# Patient Record
Sex: MALE | Race: WHITE | NOT HISPANIC OR LATINO | Employment: FULL TIME | URBAN - METROPOLITAN AREA
[De-identification: names, ages, dates, MRNs, and addresses within clinical notes are randomized per-mention and may not be internally consistent; named-entity substitution may affect disease eponyms.]

---

## 2017-11-29 ENCOUNTER — OFFICE VISIT (OUTPATIENT)
Dept: FAMILY MEDICINE CLINIC | Facility: CLINIC | Age: 27
End: 2017-11-29

## 2017-11-29 VITALS
RESPIRATION RATE: 16 BRPM | SYSTOLIC BLOOD PRESSURE: 110 MMHG | HEART RATE: 82 BPM | OXYGEN SATURATION: 98 % | DIASTOLIC BLOOD PRESSURE: 76 MMHG | HEIGHT: 70 IN | BODY MASS INDEX: 29.2 KG/M2 | WEIGHT: 204 LBS | TEMPERATURE: 97.8 F

## 2017-11-29 DIAGNOSIS — F17.210 CIGARETTE NICOTINE DEPENDENCE WITHOUT COMPLICATION: ICD-10-CM

## 2017-11-29 DIAGNOSIS — K58.0 IRRITABLE BOWEL SYNDROME WITH DIARRHEA: ICD-10-CM

## 2017-11-29 DIAGNOSIS — Z76.89 ENCOUNTER TO ESTABLISH CARE: Primary | ICD-10-CM

## 2017-11-29 DIAGNOSIS — F17.220 CHEWING TOBACCO NICOTINE DEPENDENCE WITHOUT COMPLICATION: ICD-10-CM

## 2017-11-29 PROCEDURE — 99407 BEHAV CHNG SMOKING > 10 MIN: CPT | Performed by: PHYSICIAN ASSISTANT

## 2017-11-29 PROCEDURE — 99203 OFFICE O/P NEW LOW 30 MIN: CPT | Performed by: PHYSICIAN ASSISTANT

## 2017-11-29 RX ORDER — BUPROPION HYDROCHLORIDE 150 MG/1
TABLET, EXTENDED RELEASE ORAL
Qty: 60 TABLET | Refills: 2 | Status: SHIPPED | OUTPATIENT
Start: 2017-11-29 | End: 2018-08-29

## 2017-11-29 RX ORDER — DICYCLOMINE HYDROCHLORIDE 10 MG/1
10 CAPSULE ORAL
Qty: 120 CAPSULE | Refills: 1 | Status: SHIPPED | OUTPATIENT
Start: 2017-11-29 | End: 2019-05-06

## 2017-11-29 RX ORDER — BROMPHENIRAMINE MALEATE, PSEUDOEPHEDRINE HYDROCHLORIDE, AND DEXTROMETHORPHAN HYDROBROMIDE 2; 30; 10 MG/5ML; MG/5ML; MG/5ML
SYRUP ORAL 4 TIMES DAILY PRN
COMMUNITY
End: 2017-11-30

## 2017-11-29 NOTE — PROGRESS NOTES
Yanique Pride is a 27 y.o. male.     History of Present Illness   Pt presents to establish care. No significant PMH. Been awhile since had primary provider.     Frequent diarrhea, right after eating. Has been going on for years . Goes usually 3 X per day   Belches a lot   Feels like something gets stuck in throat sometimes   Denies heartburn. Some stomach cramping   No family hx of colon cancer or polyps  No particular food triggers he is aware of.   No issues with constipation     Dips, 15 years (started at age 12)  Use full can (started at age 12-13_   Smokes 2-3 cigarettes per day   Has quit in the past, went to E-cigarettes X 5-6 months. Went down to low nicotine level. Father passed away and started back up   Patient has tried nicotine gum in the past. Ran out a lot. 20 in a box, only last 2-3 days   Interested in trying Wellbutrin to help and quit for good.     The following portions of the patient's history were reviewed and updated as appropriate: allergies, current medications, past family history, past medical history, past social history, past surgical history and problem list.    Review of Systems   Constitutional: Negative.  Negative for chills, diaphoresis, fatigue and fever.   HENT: Negative.  Negative for congestion, ear discharge, ear pain, hearing loss, nosebleeds, postnasal drip, sinus pressure, sneezing and sore throat.    Eyes: Negative.    Respiratory: Negative.  Negative for cough, chest tightness, shortness of breath and wheezing.    Cardiovascular: Negative.  Negative for chest pain, palpitations and leg swelling.   Gastrointestinal: Positive for diarrhea. Negative for abdominal distention, abdominal pain, anal bleeding, blood in stool, constipation, nausea, rectal pain and vomiting.        Stomach cramping    Endocrine: Negative.  Negative for cold intolerance, heat intolerance, polydipsia, polyphagia and polyuria.   Genitourinary: Negative.  Negative for difficulty urinating,  "dysuria, flank pain, frequency, hematuria and urgency.   Musculoskeletal: Negative.  Negative for arthralgias, back pain, gait problem, joint swelling, myalgias, neck pain and neck stiffness.   Skin: Negative.  Negative for color change, pallor, rash and wound.   Allergic/Immunologic: Negative.  Negative for immunocompromised state.   Neurological: Negative for dizziness, syncope, weakness, light-headedness, numbness and headaches.   Hematological: Negative.  Negative for adenopathy. Does not bruise/bleed easily.   Psychiatric/Behavioral: Negative.  Negative for behavioral problems, confusion, self-injury, sleep disturbance and suicidal ideas. The patient is not nervous/anxious.        Objective    Blood pressure 110/76, pulse 82, temperature 97.8 °F (36.6 °C), temperature source Temporal Artery , resp. rate 16, height 70\" (177.8 cm), weight 204 lb (92.5 kg), SpO2 98 %.     Physical Exam   Constitutional: He is oriented to person, place, and time. He appears well-developed and well-nourished.   HENT:   Head: Normocephalic and atraumatic.   Right Ear: Tympanic membrane, external ear and ear canal normal.   Left Ear: Tympanic membrane, external ear and ear canal normal.   Nose: Nose normal.   Mouth/Throat: Oropharynx is clear and moist. No oropharyngeal exudate.   Eyes: Conjunctivae and EOM are normal. Pupils are equal, round, and reactive to light.   Neck: Normal range of motion. Neck supple. No tracheal deviation present. No thyromegaly present.   Cardiovascular: Normal rate, regular rhythm, normal heart sounds and intact distal pulses.  Exam reveals no gallop and no friction rub.    No murmur heard.  Pulmonary/Chest: Effort normal and breath sounds normal. No respiratory distress. He has no wheezes. He has no rales. He exhibits no tenderness.   Abdominal: Soft. Bowel sounds are normal. He exhibits no distension and no mass. There is no tenderness. There is no rebound and no guarding. No hernia.   Musculoskeletal: " Normal range of motion. He exhibits no edema, tenderness or deformity.   Lymphadenopathy:     He has no cervical adenopathy.   Neurological: He is alert and oriented to person, place, and time. He has normal reflexes.   Skin: Skin is warm and dry.   Psychiatric: He has a normal mood and affect. His behavior is normal. Judgment and thought content normal.   Nursing note and vitals reviewed.      Assessment/Plan   Neil was seen today for establish care and discuss dipping, smoking.    Diagnoses and all orders for this visit:    Encounter to establish care    Cigarette nicotine dependence without complication  -     buPROPion SR (WELLBUTRIN SR) 150 MG 12 hr tablet; Take one tablet by mouth daily X 7 days, then take one tablet by mouth BID    Chewing tobacco nicotine dependence without complication    Irritable bowel syndrome with diarrhea  -     dicyclomine (BENTYL) 10 MG capsule; Take 1 capsule by mouth 4 (Four) Times a Day Before Meals & at Bedtime.    I advised the patient of the risks in continuing to use tobacco, and I provided this patient with smoking cessation educational materials.  I also discussed how to quit smoking and the patient has expressed the willingness to quit.      During this visit, I spent greater than 10 minutes counseling the patient regarding smoking cessation. Will try wellbutrin. F/U in 4-6 weeks     Will try bentyl as directed for likely IBS-D. F/U if no improvement. Encourage screening labs in the future. Encourage trial of cutting out dairy (eats a lot of cheese)

## 2017-11-30 PROBLEM — F17.220 CHEWING TOBACCO NICOTINE DEPENDENCE WITHOUT COMPLICATION: Status: ACTIVE | Noted: 2017-11-30

## 2017-11-30 PROBLEM — F17.210 CIGARETTE NICOTINE DEPENDENCE WITHOUT COMPLICATION: Status: ACTIVE | Noted: 2017-11-30

## 2017-11-30 PROBLEM — K58.0 IRRITABLE BOWEL SYNDROME WITH DIARRHEA: Status: ACTIVE | Noted: 2017-11-30

## 2018-01-31 ENCOUNTER — HOSPITAL ENCOUNTER (OUTPATIENT)
Dept: GENERAL RADIOLOGY | Facility: HOSPITAL | Age: 28
Discharge: HOME | End: 2018-01-31
Admitting: PHYSICIAN ASSISTANT

## 2018-01-31 ENCOUNTER — OFFICE VISIT (OUTPATIENT)
Dept: FAMILY MEDICINE CLINIC | Facility: CLINIC | Age: 28
End: 2018-01-31

## 2018-01-31 VITALS
SYSTOLIC BLOOD PRESSURE: 114 MMHG | HEART RATE: 73 BPM | HEIGHT: 70 IN | RESPIRATION RATE: 18 BRPM | TEMPERATURE: 97.3 F | BODY MASS INDEX: 29.2 KG/M2 | OXYGEN SATURATION: 99 % | WEIGHT: 204 LBS | DIASTOLIC BLOOD PRESSURE: 74 MMHG

## 2018-01-31 DIAGNOSIS — F17.210 CIGARETTE NICOTINE DEPENDENCE WITHOUT COMPLICATION: ICD-10-CM

## 2018-01-31 DIAGNOSIS — S39.92XD INJURY OF LOW BACK, SUBSEQUENT ENCOUNTER: Primary | ICD-10-CM

## 2018-01-31 DIAGNOSIS — F17.220 CHEWING TOBACCO NICOTINE DEPENDENCE WITHOUT COMPLICATION: ICD-10-CM

## 2018-01-31 DIAGNOSIS — M54.50 ACUTE MIDLINE LOW BACK PAIN WITHOUT SCIATICA: ICD-10-CM

## 2018-01-31 PROCEDURE — 99406 BEHAV CHNG SMOKING 3-10 MIN: CPT | Performed by: PHYSICIAN ASSISTANT

## 2018-01-31 PROCEDURE — 72100 X-RAY EXAM L-S SPINE 2/3 VWS: CPT

## 2018-01-31 PROCEDURE — 99213 OFFICE O/P EST LOW 20 MIN: CPT | Performed by: PHYSICIAN ASSISTANT

## 2018-01-31 NOTE — PROGRESS NOTES
Subjective   Neil Pride is a 27 y.o. male.     F/U form ER visit yesterday at [facility]. Was working in garage around 2-3 pm yesterday, was lifting about a 3 lb box of parts. Turned and felt sudden pain in his low back. Brought him to the ground and could hardly get up. Went to the hospital following this incident. No imaging, just exam and acute pain management. States he is a little better today, but still very uncomfortable. No radiation of pain. No leg symptoms.   ER sent him home with Rx for cyclobenzaprine, Norco, and Ibuprofen 800 mg. Has not started taking. Rx's will be scanned into chart.   Works at [employer]. Not ready to return at this time. Unable to work with restrictions, and job can be physically demanding.     Back Pain   This is a new problem. The current episode started yesterday. The problem occurs constantly. The problem has been gradually improving since onset. The pain is present in the lumbar spine. The quality of the pain is described as aching. The pain does not radiate. The pain is at a severity of 6/10. The pain is moderate. The pain is the same all the time. The symptoms are aggravated by bending, standing, twisting and position. Stiffness is present all day. Pertinent negatives include no abdominal pain, bladder incontinence, bowel incontinence, chest pain, dysuria, fever, headaches, leg pain, numbness, paresis, paresthesias, pelvic pain, perianal numbness, tingling, weakness or weight loss. He has tried nothing for the symptoms.        The following portions of the patient's history were reviewed and updated as appropriate: allergies, current medications, past family history, past medical history, past social history, past surgical history and problem list.    Review of Systems   Constitutional: Negative.  Negative for chills, diaphoresis, fatigue, fever and weight loss.   HENT: Negative.  Negative for congestion, ear discharge, ear pain, hearing loss, nosebleeds,  "postnasal drip, sinus pressure, sneezing and sore throat.    Eyes: Negative.    Respiratory: Negative.  Negative for cough, chest tightness, shortness of breath and wheezing.    Cardiovascular: Negative.  Negative for chest pain, palpitations and leg swelling.   Gastrointestinal: Negative for abdominal distention, abdominal pain, anal bleeding, blood in stool, bowel incontinence, constipation, diarrhea, nausea, rectal pain and vomiting.   Endocrine: Negative.  Negative for cold intolerance, heat intolerance, polydipsia, polyphagia and polyuria.   Genitourinary: Negative.  Negative for bladder incontinence, difficulty urinating, dysuria, flank pain, frequency, hematuria, pelvic pain and urgency.   Musculoskeletal: Positive for back pain and myalgias. Negative for arthralgias, gait problem, joint swelling, neck pain and neck stiffness.   Skin: Negative.  Negative for color change, pallor, rash and wound.   Allergic/Immunologic: Negative.  Negative for immunocompromised state.   Neurological: Negative for dizziness, tingling, syncope, weakness, light-headedness, numbness, headaches and paresthesias.   Hematological: Negative.  Negative for adenopathy. Does not bruise/bleed easily.   Psychiatric/Behavioral: Negative.  Negative for behavioral problems, confusion, self-injury, sleep disturbance and suicidal ideas. The patient is not nervous/anxious.        Objective    Blood pressure 114/74, pulse 73, temperature 97.3 °F (36.3 °C), temperature source Temporal Artery , resp. rate 18, height 177.8 cm (70\"), weight 92.5 kg (204 lb), SpO2 99 %.     Physical Exam   Constitutional: He is oriented to person, place, and time. He appears well-developed and well-nourished.   HENT:   Head: Normocephalic and atraumatic.   Right Ear: External ear normal.   Left Ear: External ear normal.   Nose: Nose normal.   Mouth/Throat: Oropharynx is clear and moist. No oropharyngeal exudate.   Eyes: Conjunctivae and EOM are normal. Pupils are " equal, round, and reactive to light.   Neck: Normal range of motion. Neck supple. No tracheal deviation present. No thyromegaly present.   Cardiovascular: Normal rate, regular rhythm, normal heart sounds and intact distal pulses.  Exam reveals no gallop and no friction rub.    No murmur heard.  Pulmonary/Chest: Effort normal and breath sounds normal. No respiratory distress. He has no wheezes. He has no rales. He exhibits no tenderness.   Abdominal: Soft. Bowel sounds are normal. He exhibits no distension and no mass. There is no tenderness. There is no rebound and no guarding. No hernia.   Musculoskeletal: He exhibits no edema or deformity.        Lumbar back: He exhibits decreased range of motion, tenderness, bony tenderness and pain. He exhibits no swelling, no edema, no deformity and no spasm.   Lymphadenopathy:     He has no cervical adenopathy.   Neurological: He is alert and oriented to person, place, and time. He has normal reflexes.   Skin: Skin is warm and dry.   Psychiatric: He has a normal mood and affect. His behavior is normal. Judgment and thought content normal.   Nursing note and vitals reviewed.      Assessment/Plan   Neil was seen today for back pain.    Diagnoses and all orders for this visit:    Injury of low back, subsequent encounter  -     XR Spine Lumbar AP & Lateral    Acute midline low back pain without sciatica  -     XR Spine Lumbar AP & Lateral      Will proceed with XR imaging since pain is very midline. F/U pending imaging. Will complete FMLA papers for work as he should remain off at this time. F/U in office in 1 week to re-evaluate symptoms. May take medication as prescribed by ER, reiterated abuse and addiction potential of narcotic pain medication.     It is very important that he quit smoking. There are various alternatives available to help with this difficult task, but first and foremost, he must make a firm commitment and decision to quit. The nature of nicotine addiction is  discussed. The usefulness of behavioral therapy is discussed and suggested.  The correct use, cost and side effects of nicotine replacement therapy such as gum or patches is discussed. Bupropion and its cost (sometimes not covered fully by insurance) and side effects are reviewed. The quit rates are discussed. I recommend he not allow potential costs of treatment to deter him from using nicotine replacement therapy or bupropion, as the long term economic and health benefits are obvious.   I advised the patient of the risks in continuing to use tobacco, and I provided this patient with smoking cessation educational materials.  I also discussed how to quit smoking and the patient has expressed the willingness to quit.      During this visit, I spent 3-10 mintues counseling the patient regarding smoking cessation.

## 2018-02-07 ENCOUNTER — OFFICE VISIT (OUTPATIENT)
Dept: FAMILY MEDICINE CLINIC | Facility: CLINIC | Age: 28
End: 2018-02-07

## 2018-02-07 VITALS
HEART RATE: 78 BPM | DIASTOLIC BLOOD PRESSURE: 70 MMHG | SYSTOLIC BLOOD PRESSURE: 120 MMHG | BODY MASS INDEX: 29.35 KG/M2 | TEMPERATURE: 97.9 F | HEIGHT: 70 IN | OXYGEN SATURATION: 98 % | RESPIRATION RATE: 16 BRPM | WEIGHT: 205 LBS

## 2018-02-07 DIAGNOSIS — M54.50 ACUTE MIDLINE LOW BACK PAIN WITHOUT SCIATICA: ICD-10-CM

## 2018-02-07 DIAGNOSIS — S39.92XD INJURY OF LOW BACK, SUBSEQUENT ENCOUNTER: Primary | ICD-10-CM

## 2018-02-07 PROCEDURE — 99213 OFFICE O/P EST LOW 20 MIN: CPT | Performed by: PHYSICIAN ASSISTANT

## 2018-02-07 RX ORDER — CYCLOBENZAPRINE HCL 10 MG
TABLET ORAL
COMMUNITY
Start: 2018-01-31 | End: 2018-02-07

## 2018-02-07 RX ORDER — IBUPROFEN 800 MG/1
TABLET ORAL
COMMUNITY
Start: 2018-01-31 | End: 2018-02-07

## 2018-02-07 NOTE — PROGRESS NOTES
Yanique Pride is a 27 y.o. male.     History of Present Illness   Pt presents for f/u for back injury and midline low back pain. Has been resting, taking NSAID, muscle relaxer and analgesic as needed. Stopped taking as of today. Feels much better today. Only feels muscle tightness from time to time. Ready to return to work tomorrow.     The following portions of the patient's history were reviewed and updated as appropriate: allergies, current medications, past family history, past medical history, past social history, past surgical history and problem list.    Review of Systems   Constitutional: Negative.  Negative for chills, diaphoresis, fatigue and fever.   HENT: Negative.  Negative for congestion, ear discharge, ear pain, hearing loss, nosebleeds, postnasal drip, sinus pressure, sneezing and sore throat.    Eyes: Negative.    Respiratory: Negative.  Negative for cough, chest tightness, shortness of breath and wheezing.    Cardiovascular: Negative.  Negative for chest pain, palpitations and leg swelling.   Gastrointestinal: Negative for abdominal distention, abdominal pain, anal bleeding, blood in stool, constipation, diarrhea, nausea, rectal pain and vomiting.   Endocrine: Negative.  Negative for cold intolerance, heat intolerance, polydipsia, polyphagia and polyuria.   Genitourinary: Negative.  Negative for difficulty urinating, dysuria, flank pain, frequency, hematuria and urgency.   Musculoskeletal: Positive for back pain and myalgias. Negative for arthralgias, gait problem, joint swelling, neck pain and neck stiffness.   Skin: Negative.  Negative for color change, pallor, rash and wound.   Allergic/Immunologic: Negative.  Negative for immunocompromised state.   Neurological: Negative for dizziness, syncope, weakness, light-headedness, numbness and headaches.   Hematological: Negative.  Negative for adenopathy. Does not bruise/bleed easily.   Psychiatric/Behavioral: Negative.  Negative for  "behavioral problems, confusion, self-injury, sleep disturbance and suicidal ideas. The patient is not nervous/anxious.        Objective    Blood pressure 120/70, pulse 78, temperature 97.9 °F (36.6 °C), temperature source Temporal Artery , resp. rate 16, height 177.8 cm (70\"), weight 93 kg (205 lb), SpO2 98 %.     Physical Exam   Constitutional: He is oriented to person, place, and time. He appears well-developed and well-nourished.   HENT:   Head: Normocephalic and atraumatic.   Right Ear: External ear normal.   Left Ear: External ear normal.   Nose: Nose normal.   Mouth/Throat: Oropharynx is clear and moist. No oropharyngeal exudate.   Eyes: Conjunctivae and EOM are normal. Pupils are equal, round, and reactive to light.   Neck: Normal range of motion. Neck supple. No tracheal deviation present. No thyromegaly present.   Cardiovascular: Normal rate, regular rhythm, normal heart sounds and intact distal pulses.  Exam reveals no gallop and no friction rub.    No murmur heard.  Pulmonary/Chest: Effort normal and breath sounds normal. No respiratory distress. He has no wheezes. He has no rales. He exhibits no tenderness.   Abdominal: Soft. Bowel sounds are normal. He exhibits no distension and no mass. There is no tenderness. There is no rebound and no guarding. No hernia.   Musculoskeletal: Normal range of motion. He exhibits no edema or deformity.        Lumbar back: He exhibits tenderness. He exhibits normal range of motion, no bony tenderness, no swelling, no edema, no deformity, no laceration, no pain, no spasm and normal pulse.   Lymphadenopathy:     He has no cervical adenopathy.   Neurological: He is alert and oriented to person, place, and time. He has normal reflexes.   Skin: Skin is warm and dry.   Psychiatric: He has a normal mood and affect. His behavior is normal. Judgment and thought content normal.   Nursing note and vitals reviewed.      Assessment/Plan   Neil was seen today for back " pain.    Diagnoses and all orders for this visit:    Injury of low back, subsequent encounter    Acute midline low back pain without sciatica      Release back to work tomorrow 2/8/18 without restrictions. Pt to follow up if any new or worsening symptoms develop. Pt agrees.

## 2018-08-29 ENCOUNTER — OFFICE VISIT (OUTPATIENT)
Dept: FAMILY MEDICINE CLINIC | Facility: CLINIC | Age: 28
End: 2018-08-29

## 2018-08-29 VITALS
SYSTOLIC BLOOD PRESSURE: 104 MMHG | DIASTOLIC BLOOD PRESSURE: 70 MMHG | RESPIRATION RATE: 20 BRPM | WEIGHT: 205 LBS | BODY MASS INDEX: 29.35 KG/M2 | TEMPERATURE: 97 F | HEIGHT: 70 IN | HEART RATE: 72 BPM

## 2018-08-29 DIAGNOSIS — Z23 NEED FOR HEPATITIS A VACCINATION: Primary | ICD-10-CM

## 2018-08-29 PROCEDURE — 90471 IMMUNIZATION ADMIN: CPT | Performed by: PHYSICIAN ASSISTANT

## 2018-08-29 PROCEDURE — 90632 HEPA VACCINE ADULT IM: CPT | Performed by: PHYSICIAN ASSISTANT

## 2018-08-29 PROCEDURE — 99212 OFFICE O/P EST SF 10 MIN: CPT | Performed by: PHYSICIAN ASSISTANT

## 2018-08-29 NOTE — PROGRESS NOTES
"Yanique Pride is a 28 y.o. male.     History of Present Illness   Pt presents requesting Hep A vaccine. Concerned over recent outbreaks close to his home including local [employer]. Pt denies current illness or previous severe immunization reaction.     The following portions of the patient's history were reviewed and updated as appropriate: allergies, current medications, past family history, past medical history, past social history, past surgical history and problem list.    Review of Systems   Constitutional: Negative for chills, fatigue and fever.   HENT: Negative for congestion, ear pain, rhinorrhea, sneezing and sore throat.    Eyes: Negative.    Respiratory: Negative for cough, shortness of breath and wheezing.    Cardiovascular: Negative for chest pain.   Gastrointestinal: Negative for abdominal pain, constipation, diarrhea, nausea and vomiting.   Musculoskeletal: Negative for arthralgias and myalgias.   Skin: Negative for rash.   Allergic/Immunologic: Negative.    Neurological: Negative for headaches.       Objective    Blood pressure 104/70, pulse 72, temperature 97 °F (36.1 °C), resp. rate 20, height 177.8 cm (70\"), weight 93 kg (205 lb).     Physical Exam   Constitutional: He is oriented to person, place, and time. He appears well-developed and well-nourished.   HENT:   Head: Normocephalic.   Right Ear: Tympanic membrane, external ear and ear canal normal.   Left Ear: Tympanic membrane, external ear and ear canal normal.   Nose: Nose normal.   Mouth/Throat: Oropharynx is clear and moist.   Eyes: Conjunctivae are normal.   Cardiovascular: Normal rate, regular rhythm and normal heart sounds.    Pulmonary/Chest: Effort normal and breath sounds normal. No respiratory distress.   Lymphadenopathy:     He has no cervical adenopathy.   Neurological: He is alert and oriented to person, place, and time.   Skin: Skin is warm.   Psychiatric: He has a normal mood and affect. His behavior is normal. " Judgment and thought content normal.   Nursing note and vitals reviewed.      Assessment/Plan   Neil was seen today for immunizations.    Diagnoses and all orders for this visit:    Need for hepatitis A vaccination  -     Hepatitis A Vaccine Adult IM      Pt educated on Hep A vaccine including risks and benefits. Pt consents to immunization. Return in 6 months for second dose.

## 2019-03-08 ENCOUNTER — OFFICE VISIT (OUTPATIENT)
Dept: FAMILY MEDICINE CLINIC | Facility: CLINIC | Age: 29
End: 2019-03-08

## 2019-03-08 VITALS
DIASTOLIC BLOOD PRESSURE: 68 MMHG | OXYGEN SATURATION: 98 % | HEART RATE: 95 BPM | SYSTOLIC BLOOD PRESSURE: 108 MMHG | BODY MASS INDEX: 29.7 KG/M2 | WEIGHT: 207 LBS | TEMPERATURE: 97.8 F | RESPIRATION RATE: 18 BRPM

## 2019-03-08 DIAGNOSIS — J02.9 SORE THROAT: ICD-10-CM

## 2019-03-08 DIAGNOSIS — J20.9 ACUTE BRONCHITIS, UNSPECIFIED ORGANISM: Primary | ICD-10-CM

## 2019-03-08 LAB
EXPIRATION DATE: NORMAL
INTERNAL CONTROL: NORMAL
Lab: NORMAL
S PYO AG THROAT QL: NEGATIVE

## 2019-03-08 PROCEDURE — 99213 OFFICE O/P EST LOW 20 MIN: CPT | Performed by: FAMILY MEDICINE

## 2019-03-08 PROCEDURE — 87880 STREP A ASSAY W/OPTIC: CPT | Performed by: FAMILY MEDICINE

## 2019-03-08 RX ORDER — DEXTROMETHORPHAN HYDROBROMIDE AND PROMETHAZINE HYDROCHLORIDE 15; 6.25 MG/5ML; MG/5ML
5 SYRUP ORAL 4 TIMES DAILY PRN
Qty: 180 ML | Refills: 0 | Status: SHIPPED | OUTPATIENT
Start: 2019-03-08 | End: 2019-05-06

## 2019-03-08 RX ORDER — METHYLPREDNISOLONE 4 MG/1
TABLET ORAL
Qty: 21 EACH | Refills: 0 | Status: SHIPPED | OUTPATIENT
Start: 2019-03-08 | End: 2019-05-06

## 2019-03-08 RX ORDER — AZITHROMYCIN 250 MG/1
TABLET, FILM COATED ORAL
Qty: 6 TABLET | Refills: 0 | Status: SHIPPED | OUTPATIENT
Start: 2019-03-08 | End: 2019-05-06

## 2019-03-08 NOTE — PROGRESS NOTES
Subjective   Neil Pride is a 28 y.o. male.   Chief Complaint   Patient presents with   • Cough     Green mucus    • Sore Throat     Sevral days    • Nasal Congestion     Cough   This is a new problem. The current episode started 1 to 4 weeks ago. The problem has been unchanged. The problem occurs every few minutes. The cough is productive of sputum. Associated symptoms include nasal congestion, postnasal drip and a sore throat. Pertinent negatives include no chills, fever, headaches or shortness of breath. The symptoms are aggravated by lying down. Risk factors for lung disease include smoking/tobacco exposure. He has tried nothing for the symptoms. The treatment provided no relief.   Sore Throat    This is a new problem. The current episode started 1 to 4 weeks ago. The problem has been unchanged. There has been no fever. Associated symptoms include congestion, coughing and a hoarse voice. Pertinent negatives include no headaches or shortness of breath. He has tried nothing for the symptoms. The treatment provided no relief.        The following portions of the patient's history were reviewed and updated as appropriate: allergies, current medications, past family history, past medical history, past social history, past surgical history and problem list.    Review of Systems   Constitutional: Negative for chills and fever.   HENT: Positive for congestion, hoarse voice, postnasal drip and sore throat. Negative for sinus pressure.    Respiratory: Positive for cough. Negative for shortness of breath.    Cardiovascular: Negative.    Gastrointestinal: Negative.    Neurological: Positive for headache.       Objective   Physical Exam   Constitutional: He appears well-developed and well-nourished. No distress.   HENT:   Head: Normocephalic.   Right Ear: Tympanic membrane, external ear and ear canal normal.   Left Ear: Tympanic membrane, external ear and ear canal normal.   Nose: Congestion present.   Mouth/Throat:  Uvula is midline, oropharynx is clear and moist and mucous membranes are normal.   Eyes: Conjunctivae are normal.   Neck: Neck supple.   Cardiovascular: Normal rate, regular rhythm and normal heart sounds.   Pulmonary/Chest: Effort normal and breath sounds normal. He has no wheezes.   Lymphadenopathy:     He has no cervical adenopathy.   Neurological: He is alert.   Skin: Skin is warm and dry.   Nursing note and vitals reviewed.        Assessment/Plan   Neil was seen today for cough, sore throat and nasal congestion.    Diagnoses and all orders for this visit:    Acute bronchitis, unspecified organism  -     azithromycin (ZITHROMAX) 250 MG tablet; Take 2 tablets the first day, then 1 tablet daily for 4 days.  -     methylPREDNISolone (MEDROL, STEVEN,) 4 MG tablet; Take as directed on package instructions.  -     promethazine-dextromethorphan (PROMETHAZINE-DM) 6.25-15 MG/5ML syrup; Take 5 mL by mouth 4 (Four) Times a Day As Needed for Cough.    Sore throat  -     POCT rapid strep A      RST negative  Treatment plan above. Follow up if no improvement.

## 2019-03-08 NOTE — PATIENT INSTRUCTIONS
Go to the nearest ER or return to clinic if symptoms worsen, fever/chill develop    Acute Bronchitis, Adult  Acute bronchitis is when air tubes (bronchi) in the lungs suddenly get swollen. The condition can make it hard to breathe. It can also cause these symptoms:  · A cough.  · Coughing up clear, yellow, or green mucus.  · Wheezing.  · Chest congestion.  · Shortness of breath.  · A fever.  · Body aches.  · Chills.  · A sore throat.    Follow these instructions at home:  Medicines  · Take over-the-counter and prescription medicines only as told by your doctor.  · If you were prescribed an antibiotic medicine, take it as told by your doctor. Do not stop taking the antibiotic even if you start to feel better.  General instructions  · Rest.  · Drink enough fluids to keep your pee (urine) clear or pale yellow.  · Avoid smoking and secondhand smoke. If you smoke and you need help quitting, ask your doctor. Quitting will help your lungs heal faster.  · Use an inhaler, cool mist vaporizer, or humidifier as told by your doctor.  · Keep all follow-up visits as told by your doctor. This is important.  How is this prevented?  To lower your risk of getting this condition again:  · Wash your hands often with soap and water. If you cannot use soap and water, use hand .  · Avoid contact with people who have cold symptoms.  · Try not to touch your hands to your mouth, nose, or eyes.  · Make sure to get the flu shot every year.    Contact a doctor if:  · Your symptoms do not get better in 2 weeks.  Get help right away if:  · You cough up blood.  · You have chest pain.  · You have very bad shortness of breath.  · You become dehydrated.  · You faint (pass out) or keep feeling like you are going to pass out.  · You keep throwing up (vomiting).  · You have a very bad headache.  · Your fever or chills gets worse.  This information is not intended to replace advice given to you by your health care provider. Make sure you discuss  any questions you have with your health care provider.  Document Released: 06/05/2009 Document Revised: 07/26/2017 Document Reviewed: 06/07/2017  [employer] Interactive Patient Education © 2018 [employer]

## 2019-04-01 ENCOUNTER — CLINICAL SUPPORT (OUTPATIENT)
Dept: FAMILY MEDICINE CLINIC | Facility: CLINIC | Age: 29
End: 2019-04-01

## 2019-04-01 DIAGNOSIS — Z23 NEED FOR HEPATITIS A IMMUNIZATION: Primary | ICD-10-CM

## 2019-04-01 PROCEDURE — 90471 IMMUNIZATION ADMIN: CPT | Performed by: PHYSICIAN ASSISTANT

## 2019-04-01 PROCEDURE — 90632 HEPA VACCINE ADULT IM: CPT | Performed by: PHYSICIAN ASSISTANT

## 2019-05-06 ENCOUNTER — OFFICE VISIT (OUTPATIENT)
Dept: FAMILY MEDICINE CLINIC | Facility: CLINIC | Age: 29
End: 2019-05-06

## 2019-05-06 VITALS
SYSTOLIC BLOOD PRESSURE: 122 MMHG | TEMPERATURE: 97.6 F | RESPIRATION RATE: 18 BRPM | WEIGHT: 214 LBS | HEART RATE: 18 BPM | DIASTOLIC BLOOD PRESSURE: 72 MMHG | BODY MASS INDEX: 30.64 KG/M2 | HEIGHT: 70 IN

## 2019-05-06 DIAGNOSIS — M54.50 ACUTE BILATERAL LOW BACK PAIN WITHOUT SCIATICA: ICD-10-CM

## 2019-05-06 DIAGNOSIS — M54.6 ACUTE MIDLINE THORACIC BACK PAIN: Primary | ICD-10-CM

## 2019-05-06 PROCEDURE — 99213 OFFICE O/P EST LOW 20 MIN: CPT | Performed by: PHYSICIAN ASSISTANT

## 2019-05-06 RX ORDER — PREDNISONE 10 MG/1
TABLET ORAL
Qty: 21 TABLET | Refills: 0 | Status: SHIPPED | OUTPATIENT
Start: 2019-05-06 | End: 2019-05-14

## 2019-05-06 RX ORDER — CYCLOBENZAPRINE HCL 10 MG
10 TABLET ORAL 3 TIMES DAILY PRN
Qty: 60 TABLET | Refills: 0 | Status: SHIPPED | OUTPATIENT
Start: 2019-05-06 | End: 2019-05-21

## 2019-05-06 NOTE — PROGRESS NOTES
Subjective   Neil Pride is a 29 y.o. male.     History of Present Illness   Pt works at [employer]   Doing yard work yesterday at his home,  weed-eating and back started hurting gradually   Took ibuprofen and applied heat yesterday and felt better   Did more yard work today. Pain exacerbated again. Side to side bending hurts worse. Has not taken anything today   Pain is in thoracic back and lower back on either side.   No fever, urinary symptoms, change in bowel habits, or radiation of pain down LE.   Called into work today. Asking for short term disability for work (states [employer] pulls from his vacation time)     The following portions of the patient's history were reviewed and updated as appropriate: allergies, current medications, past family history, past medical history, past social history, past surgical history and problem list.    Review of Systems   Constitutional: Negative.  Negative for chills, diaphoresis, fatigue and fever.   HENT: Negative.  Negative for congestion, ear discharge, ear pain, hearing loss, nosebleeds, postnasal drip, sinus pressure, sneezing and sore throat.    Eyes: Negative.    Respiratory: Negative.  Negative for cough, chest tightness, shortness of breath and wheezing.    Cardiovascular: Negative.  Negative for chest pain, palpitations and leg swelling.   Gastrointestinal: Negative for abdominal distention, abdominal pain, blood in stool, constipation, diarrhea, nausea and vomiting.   Genitourinary: Negative.  Negative for difficulty urinating, dysuria, flank pain, frequency, hematuria and urgency.   Musculoskeletal: Positive for back pain. Negative for arthralgias, gait problem, joint swelling, myalgias, neck pain and neck stiffness.   Skin: Negative.  Negative for color change, pallor, rash and wound.   Neurological: Negative for dizziness, syncope, weakness, light-headedness, numbness and headaches.       Objective    Blood pressure 122/72, pulse (!) 18, temperature 97.6 °F (36.4  "°C), resp. rate 18, height 177.8 cm (70\"), weight 97.1 kg (214 lb).     Physical Exam   Constitutional: He is oriented to person, place, and time. He appears well-developed and well-nourished.   HENT:   Head: Normocephalic and atraumatic.   Right Ear: External ear normal.   Left Ear: External ear normal.   Nose: Nose normal.   Mouth/Throat: Oropharynx is clear and moist. No oropharyngeal exudate.   Eyes: Conjunctivae are normal.   Neck: Normal range of motion. Neck supple. No tracheal deviation present. No thyromegaly present.   Cardiovascular: Normal rate, regular rhythm, normal heart sounds and intact distal pulses. Exam reveals no gallop and no friction rub.   No murmur heard.  Pulmonary/Chest: Effort normal and breath sounds normal. No respiratory distress. He has no wheezes. He has no rales. He exhibits no tenderness.   Abdominal: Soft. Bowel sounds are normal. He exhibits no distension and no mass. There is no tenderness. There is no rebound and no guarding. No hernia.   Musculoskeletal: He exhibits no edema or deformity.        Thoracic back: He exhibits decreased range of motion, tenderness and bony tenderness.        Lumbar back: He exhibits decreased range of motion and tenderness. He exhibits no bony tenderness.   Negative straight leg raises bilaterally    Lymphadenopathy:     He has no cervical adenopathy.   Neurological: He is alert and oriented to person, place, and time.   Skin: Skin is warm and dry.   Psychiatric: He has a normal mood and affect. His behavior is normal. Judgment and thought content normal.   Nursing note and vitals reviewed.      Assessment/Plan   Neil was seen today for back injury.    Diagnoses and all orders for this visit:    Acute midline thoracic back pain  -     predniSONE (DELTASONE) 10 MG tablet; Take 60 mg po day 1, 50 mg po day 2, 40 mg po day 3, 30 mg po day 4, 20 mg po day 5, 10 mg po day 6  -     cyclobenzaprine (FLEXERIL) 10 MG tablet; Take 1 tablet by mouth 3 " (Three) Times a Day As Needed for Muscle Spasms.    Acute bilateral low back pain without sciatica  -     predniSONE (DELTASONE) 10 MG tablet; Take 60 mg po day 1, 50 mg po day 2, 40 mg po day 3, 30 mg po day 4, 20 mg po day 5, 10 mg po day 6  -     cyclobenzaprine (FLEXERIL) 10 MG tablet; Take 1 tablet by mouth 3 (Three) Times a Day As Needed for Muscle Spasms.      Informed patient that current back concerns do not qualify for short term disability at this time. I am agreeable to do 3 days continuous FMLA , and follow up from there. Treatment as outlined in plan. Rest, alternate ice and heat, gentle stretching. No imaging at this time unless symptoms do not resolve.

## 2019-05-09 ENCOUNTER — OFFICE VISIT (OUTPATIENT)
Dept: FAMILY MEDICINE CLINIC | Facility: CLINIC | Age: 29
End: 2019-05-09

## 2019-05-09 ENCOUNTER — TELEPHONE (OUTPATIENT)
Dept: FAMILY MEDICINE CLINIC | Facility: CLINIC | Age: 29
End: 2019-05-09

## 2019-05-09 ENCOUNTER — HOSPITAL ENCOUNTER (OUTPATIENT)
Dept: GENERAL RADIOLOGY | Facility: HOSPITAL | Age: 29
Discharge: HOME | End: 2019-05-09
Admitting: PHYSICIAN ASSISTANT

## 2019-05-09 VITALS
WEIGHT: 218 LBS | RESPIRATION RATE: 18 BRPM | HEART RATE: 76 BPM | TEMPERATURE: 97.5 F | DIASTOLIC BLOOD PRESSURE: 72 MMHG | HEIGHT: 70 IN | BODY MASS INDEX: 31.21 KG/M2 | SYSTOLIC BLOOD PRESSURE: 124 MMHG

## 2019-05-09 DIAGNOSIS — M54.50 ACUTE BILATERAL LOW BACK PAIN WITHOUT SCIATICA: ICD-10-CM

## 2019-05-09 DIAGNOSIS — M54.6 ACUTE MIDLINE THORACIC BACK PAIN: Primary | ICD-10-CM

## 2019-05-09 PROCEDURE — 72072 X-RAY EXAM THORAC SPINE 3VWS: CPT

## 2019-05-09 PROCEDURE — 72110 X-RAY EXAM L-2 SPINE 4/>VWS: CPT

## 2019-05-09 PROCEDURE — 99213 OFFICE O/P EST LOW 20 MIN: CPT | Performed by: PHYSICIAN ASSISTANT

## 2019-05-09 NOTE — PROGRESS NOTES
Subjective   Neil Pride is a 29 y.o. male.     History of Present Illness   F/u from office visit Monday for thoracic and low back pain sustained while doing yard work his past Sunday.   Started steroid taper and muscle relaxer. States still having pain   Now getting harder to bend straight down as well as side to side. Feels like pain is moving up back more   Sleeping okay   No radiation of pain to legs.     Works at [employer] and does not feel like he can return to work at this time. Still wishing to pursue short term disability   Note patient also did short term disability for back injury around January of 2018 to injury sustained while working in his garage.     Rates pain 6/10   The following portions of the patient's history were reviewed and updated as appropriate: allergies, current medications, past family history, past medical history, past social history, past surgical history and problem list.    Review of Systems   Constitutional: Negative.  Negative for chills, diaphoresis, fatigue and fever.   HENT: Negative.  Negative for congestion, ear discharge, ear pain, hearing loss, nosebleeds, postnasal drip, sinus pressure, sneezing and sore throat.    Eyes: Negative.    Respiratory: Negative.  Negative for cough, chest tightness, shortness of breath and wheezing.    Cardiovascular: Negative.  Negative for chest pain, palpitations and leg swelling.   Gastrointestinal: Negative for abdominal distention, abdominal pain, blood in stool, constipation, diarrhea, nausea and vomiting.   Genitourinary: Negative.  Negative for difficulty urinating, dysuria, flank pain, frequency, hematuria and urgency.   Musculoskeletal: Positive for back pain. Negative for arthralgias, gait problem, joint swelling, myalgias, neck pain and neck stiffness.   Skin: Negative.  Negative for color change, pallor, rash and wound.   Neurological: Negative for dizziness, syncope, weakness, light-headedness, numbness and headaches.  "      Objective    Blood pressure 124/72, pulse 76, temperature 97.5 °F (36.4 °C), resp. rate 18, height 177.8 cm (70\"), weight 98.9 kg (218 lb).     Physical Exam  Constitutional: He is oriented to person, place, and time. He appears well-developed and well-nourished.   HENT:   Head: Normocephalic and atraumatic.   Right Ear: External ear normal.   Left Ear: External ear normal.   Nose: Nose normal.   Mouth/Throat: Oropharynx is clear and moist. No oropharyngeal exudate.   Eyes: Conjunctivae are normal.   Neck: Normal range of motion. Neck supple. No tracheal deviation present. No thyromegaly present.   Cardiovascular: Normal rate, regular rhythm, normal heart sounds and intact distal pulses. Exam reveals no gallop and no friction rub.   No murmur heard.  Pulmonary/Chest: Effort normal and breath sounds normal. No respiratory distress. He has no wheezes. He has no rales. He exhibits no tenderness.   Abdominal: Soft. Bowel sounds are normal. He exhibits no distension and no mass. There is no tenderness. There is no rebound and no guarding. No hernia.   Musculoskeletal: He exhibits no edema or deformity.        Thoracic back: He exhibits decreased range of motion, tenderness and bony tenderness.        Lumbar back: He exhibits decreased range of motion and tenderness. He exhibits no bony tenderness.   Negative straight leg raises bilaterally    Lymphadenopathy:     He has no cervical adenopathy.   Neurological: He is alert and oriented to person, place, and time.   Skin: Skin is warm and dry.   Psychiatric: He has a normal mood and affect. His behavior is normal. Judgment and thought content normal.   Nursing note and vitals reviewed.    Assessment/Plan   Neil was seen today for back pain.    Diagnoses and all orders for this visit:    Acute midline thoracic back pain  -     XR Spine Thoracic 3 View  -     Ambulatory Referral to Physical Therapy Evaluate and treat    Acute bilateral low back pain without sciatica  - "     XR Spine Lumbar 4+ View  -     Ambulatory Referral to Physical Therapy Evaluate and treat    will proceed with imaging of back as noted   Referral to PT placed.   In order for me to proceed with short term disability, patient aware that he must show active treatment with PT to help resolve back issue. Pt agrees

## 2019-05-14 ENCOUNTER — TELEPHONE (OUTPATIENT)
Dept: FAMILY MEDICINE CLINIC | Facility: CLINIC | Age: 29
End: 2019-05-14

## 2019-05-14 ENCOUNTER — OFFICE VISIT (OUTPATIENT)
Dept: FAMILY MEDICINE CLINIC | Facility: CLINIC | Age: 29
End: 2019-05-14

## 2019-05-14 VITALS
HEART RATE: 76 BPM | SYSTOLIC BLOOD PRESSURE: 122 MMHG | BODY MASS INDEX: 30.75 KG/M2 | HEIGHT: 70 IN | RESPIRATION RATE: 18 BRPM | DIASTOLIC BLOOD PRESSURE: 80 MMHG | TEMPERATURE: 98.8 F | WEIGHT: 214.8 LBS

## 2019-05-14 DIAGNOSIS — M54.50 ACUTE MIDLINE LOW BACK PAIN WITHOUT SCIATICA: Primary | ICD-10-CM

## 2019-05-14 PROCEDURE — 99213 OFFICE O/P EST LOW 20 MIN: CPT | Performed by: PHYSICIAN ASSISTANT

## 2019-05-14 RX ORDER — HYDROCODONE BITARTRATE AND ACETAMINOPHEN 5; 325 MG/1; MG/1
1 TABLET ORAL EVERY 6 HOURS PRN
Qty: 12 TABLET | Refills: 0 | Status: SHIPPED | OUTPATIENT
Start: 2019-05-14 | End: 2019-05-21

## 2019-05-14 NOTE — TELEPHONE ENCOUNTER
Called and spoke with PT. Doing routine exercise and manipulation and patient went into severe spasms. Relaxed some with traction. Plans to see therapy back tomorrow. Therapist concerned for spondylolisthesis along upper lumbar region. Initial imaging normal on XR. [facility]

## 2019-05-14 NOTE — TELEPHONE ENCOUNTER
----- Message from Roni Bolton sent at 5/14/2019  8:20 AM EDT -----  Contact: [facility]  - MANI SINGLETON WITH [facility]  CALLED AND STATED THAT PATIENT DID WELL WITH  [facility] AND  OK OVER THE WEEKEND.  HE WAS BACK YESTERDAY AND BY THE END OF HIS SESSION HE WAS IN SEVERE PAIN AND COULD HARDLY WALK.  MANI WOULD LIKE TO TALK TO YOU REGARDING THIS CASE    MANI [phone]

## 2019-05-21 ENCOUNTER — OFFICE VISIT (OUTPATIENT)
Dept: FAMILY MEDICINE CLINIC | Facility: CLINIC | Age: 29
End: 2019-05-21

## 2019-05-21 VITALS
HEART RATE: 72 BPM | RESPIRATION RATE: 18 BRPM | TEMPERATURE: 97.2 F | BODY MASS INDEX: 30.92 KG/M2 | WEIGHT: 216 LBS | HEIGHT: 70 IN | SYSTOLIC BLOOD PRESSURE: 136 MMHG | DIASTOLIC BLOOD PRESSURE: 84 MMHG

## 2019-05-21 DIAGNOSIS — M54.50 ACUTE MIDLINE LOW BACK PAIN WITHOUT SCIATICA: Primary | ICD-10-CM

## 2019-05-21 PROCEDURE — 99213 OFFICE O/P EST LOW 20 MIN: CPT | Performed by: PHYSICIAN ASSISTANT

## 2019-05-21 NOTE — PROGRESS NOTES
I have reviewed the notes, assessments, and/or procedures performed by [place], I concur with her documentation of Neil Pride.

## 2019-05-21 NOTE — PROGRESS NOTES
"Yanique Pride is a 29 y.o. male.     History of Present Illness   F/u for low back pain. States he is doing much better. Denies any current pain or restriction in movement. Has one visit left with PT. Ready to return to work     The following portions of the patient's history were reviewed and updated as appropriate: allergies, current medications, past family history, past medical history, past social history, past surgical history and problem list.    Review of Systems   Constitutional: Negative.  Negative for chills, diaphoresis, fatigue and fever.   HENT: Negative.  Negative for congestion, ear discharge, ear pain, hearing loss, nosebleeds, postnasal drip, sinus pressure, sneezing and sore throat.    Eyes: Negative.    Respiratory: Negative.  Negative for cough, chest tightness, shortness of breath and wheezing.    Cardiovascular: Negative.  Negative for chest pain, palpitations and leg swelling.   Gastrointestinal: Negative for abdominal distention, abdominal pain, blood in stool, constipation, diarrhea, nausea and vomiting.   Genitourinary: Negative.  Negative for difficulty urinating, dysuria, flank pain, frequency, hematuria and urgency.   Musculoskeletal: Negative.  Negative for arthralgias, back pain, gait problem, joint swelling, myalgias, neck pain and neck stiffness.   Skin: Negative.  Negative for color change, pallor, rash and wound.   Neurological: Negative for dizziness, syncope, weakness, light-headedness, numbness and headaches.       Objective    Blood pressure 136/84, pulse 72, temperature 97.2 °F (36.2 °C), resp. rate 18, height 177.8 cm (70\"), weight 98 kg (216 lb).     Physical Exam   Constitutional: He is oriented to person, place, and time. He appears well-developed and well-nourished.   HENT:   Head: Normocephalic and atraumatic.   Right Ear: External ear normal.   Left Ear: External ear normal.   Nose: Nose normal.   Mouth/Throat: Oropharynx is clear and moist. No " oropharyngeal exudate.   Eyes: Conjunctivae are normal.   Neck: Normal range of motion. Neck supple. No tracheal deviation present. No thyromegaly present.   Cardiovascular: Normal rate, regular rhythm, normal heart sounds and intact distal pulses. Exam reveals no gallop and no friction rub.   No murmur heard.  Pulmonary/Chest: Effort normal and breath sounds normal. No respiratory distress. He has no wheezes. He has no rales. He exhibits no tenderness.   Abdominal: No hernia.   Musculoskeletal: Normal range of motion. He exhibits no edema, tenderness or deformity.        Thoracic back: Normal.        Lumbar back: Normal.   Lymphadenopathy:     He has no cervical adenopathy.   Neurological: He is alert and oriented to person, place, and time.   Skin: Skin is warm and dry.   Psychiatric: He has a normal mood and affect. His behavior is normal. Judgment and thought content normal.   Nursing note and vitals reviewed.      Assessment/Plan   Neil was seen today for back pain.    Diagnoses and all orders for this visit:    Acute midline low back pain without sciatica      Release back to work provided. Keep with PT appointment as scheduled. Practice safe lifting and frequent breaks while doing yard work to avoid re-injury. Continue stretches routinely at home. Call back PRN